# Patient Record
Sex: MALE | Race: WHITE | Employment: FULL TIME | URBAN - METROPOLITAN AREA
[De-identification: names, ages, dates, MRNs, and addresses within clinical notes are randomized per-mention and may not be internally consistent; named-entity substitution may affect disease eponyms.]

---

## 2017-01-03 ENCOUNTER — OFFICE VISIT (OUTPATIENT)
Dept: FAMILY MEDICINE CLINIC | Facility: CLINIC | Age: 42
End: 2017-01-03

## 2017-01-03 VITALS
DIASTOLIC BLOOD PRESSURE: 82 MMHG | SYSTOLIC BLOOD PRESSURE: 126 MMHG | OXYGEN SATURATION: 98 % | BODY MASS INDEX: 24.55 KG/M2 | HEART RATE: 91 BPM | HEIGHT: 68 IN | WEIGHT: 162 LBS | RESPIRATION RATE: 16 BRPM | TEMPERATURE: 99 F

## 2017-01-03 DIAGNOSIS — R74.8 ELEVATED LIVER ENZYMES: ICD-10-CM

## 2017-01-03 DIAGNOSIS — K76.0 FATTY LIVER DISEASE, NONALCOHOLIC: Primary | ICD-10-CM

## 2017-01-03 PROCEDURE — 99213 OFFICE O/P EST LOW 20 MIN: CPT | Performed by: INTERNAL MEDICINE

## 2017-01-03 NOTE — PROGRESS NOTES
[facility] Internal Medicine Office Note  Chief Complaint:   Patient presents with:  Lab Results: follow up.        HPI:   This is a 35year old male coming in for f/u for liver enzymes  Ultrasound showed fatty liver disease  Enzymes elevated  Neg 24.64 kg/m2  SpO2 98% Estimated body mass index is 24.64 kg/(m^2) as calculated from the following:    Height as of this encounter: 68\". Weight as of this encounter: 162 lb. Vital signs reviewed. Appears stated age, well groomed.   Physical Exam   Vit

## 2017-03-21 ENCOUNTER — APPOINTMENT (OUTPATIENT)
Dept: LAB | Age: 42
End: 2017-03-21
Attending: INTERNAL MEDICINE
Payer: COMMERCIAL

## 2017-03-21 DIAGNOSIS — R74.8 ELEVATED LIVER ENZYMES: ICD-10-CM

## 2017-03-21 LAB
ALBUMIN SERPL-MCNC: 4.5 G/DL (ref 3.5–4.8)
ALP LIVER SERPL-CCNC: 48 U/L (ref 45–117)
ALT SERPL-CCNC: 70 U/L (ref 17–63)
AST SERPL-CCNC: 26 U/L (ref 15–41)
BILIRUB DIRECT SERPL-MCNC: 0.1 MG/DL (ref 0.1–0.5)
BILIRUB SERPL-MCNC: 0.5 MG/DL (ref 0.1–2)
M PROTEIN MFR SERPL ELPH: 7.8 G/DL (ref 6.1–8.3)

## 2017-03-21 PROCEDURE — 36415 COLL VENOUS BLD VENIPUNCTURE: CPT

## 2017-03-21 PROCEDURE — 80076 HEPATIC FUNCTION PANEL: CPT

## 2017-03-24 ENCOUNTER — OFFICE VISIT (OUTPATIENT)
Dept: FAMILY MEDICINE CLINIC | Facility: CLINIC | Age: 42
End: 2017-03-24

## 2017-03-24 VITALS
WEIGHT: 157 LBS | BODY MASS INDEX: 23.79 KG/M2 | HEART RATE: 104 BPM | HEIGHT: 68 IN | OXYGEN SATURATION: 99 % | TEMPERATURE: 98 F | DIASTOLIC BLOOD PRESSURE: 70 MMHG | SYSTOLIC BLOOD PRESSURE: 118 MMHG | RESPIRATION RATE: 16 BRPM

## 2017-03-24 DIAGNOSIS — K76.0 FATTY LIVER DISEASE, NONALCOHOLIC: Primary | ICD-10-CM

## 2017-03-24 DIAGNOSIS — R74.8 ELEVATED LIVER ENZYMES: ICD-10-CM

## 2017-03-24 NOTE — PROGRESS NOTES
Discussed LFT results are better. Recheck in 3 months. Avoid alcohol. Limit tylenol. He plans to follow up with gastroenterology since he has had fatty liver disease for over 10 years.    He is here today with his wife and discussed recent intrauterine [de-identified]

## 2017-05-22 ENCOUNTER — TELEPHONE (OUTPATIENT)
Dept: FAMILY MEDICINE CLINIC | Facility: CLINIC | Age: 42
End: 2017-05-22

## 2017-05-22 ENCOUNTER — OFFICE VISIT (OUTPATIENT)
Dept: FAMILY MEDICINE CLINIC | Facility: CLINIC | Age: 42
End: 2017-05-22

## 2017-05-22 VITALS
HEIGHT: 68 IN | BODY MASS INDEX: 23.64 KG/M2 | DIASTOLIC BLOOD PRESSURE: 80 MMHG | SYSTOLIC BLOOD PRESSURE: 130 MMHG | RESPIRATION RATE: 16 BRPM | TEMPERATURE: 98 F | WEIGHT: 156 LBS | HEART RATE: 90 BPM

## 2017-05-22 DIAGNOSIS — M54.50 ACUTE RIGHT-SIDED LOW BACK PAIN WITHOUT SCIATICA: ICD-10-CM

## 2017-05-22 DIAGNOSIS — Z87.442 HISTORY OF KIDNEY STONES: ICD-10-CM

## 2017-05-22 DIAGNOSIS — N20.0 KIDNEY STONE: Primary | ICD-10-CM

## 2017-05-22 DIAGNOSIS — R10.9 RIGHT FLANK PAIN: ICD-10-CM

## 2017-05-22 PROBLEM — K76.0 FATTY LIVER: Status: ACTIVE | Noted: 2017-05-22

## 2017-05-22 PROCEDURE — 99213 OFFICE O/P EST LOW 20 MIN: CPT | Performed by: INTERNAL MEDICINE

## 2017-05-22 PROCEDURE — 81003 URINALYSIS AUTO W/O SCOPE: CPT | Performed by: INTERNAL MEDICINE

## 2017-05-22 RX ORDER — TAMSULOSIN HYDROCHLORIDE 0.4 MG/1
0.4 CAPSULE ORAL DAILY
Qty: 30 CAPSULE | Refills: 3 | Status: SHIPPED | OUTPATIENT
Start: 2017-05-22 | End: 2017-07-27

## 2017-05-22 RX ORDER — DICLOFENAC SODIUM 75 MG/1
75 TABLET, DELAYED RELEASE ORAL 2 TIMES DAILY
Qty: 30 TABLET | Refills: 0 | Status: SHIPPED | OUTPATIENT
Start: 2017-05-22 | End: 2017-09-21

## 2017-05-22 RX ORDER — TRAMADOL HYDROCHLORIDE 50 MG/1
50 TABLET ORAL EVERY 8 HOURS PRN
Qty: 50 TABLET | Refills: 1 | Status: SHIPPED
Start: 2017-05-22 | End: 2017-09-21

## 2017-05-22 NOTE — PATIENT INSTRUCTIONS
Thank you for choosing Daly Henning MD at [place] [address]  To Do: Yumiko Lee  1. Urine test today  2. Start diclofenac twice a day for back pain  3. Start tramadol every 8 hours for back pain  4.  Increase fluids, start flomax once a day  Kidney s side effects. In addition, there is some evidence that reducing the amount of cola that one drinks may be helpful. Studies of dietary changes varied, and a consistent benefit was not found.  Certain drugs (a thiazide diuretic, citrate, and allopurinol) that Seeds        Nuts      Nut butters      Sesame seeds      Tahini      Soy nuts  Meat      None    Starch        Amaranth      Buckwheat      Cereal (bran or high fiber)      Crisp bread (rye or wheat)      Fruit cake      Grits      Pretzels      Angelia Davis example: CT scans, X rays, Ultrasound, MRI)  •Cardiac Testing in ER building Building A second floor Cardiac Testing [phone] (For example: Holter Monitor, Cardiac Stress tests,Event Monitor, or 2D Echocardiograms)  •[facility] call [phone] follow-up appointment. We cannot refill your maintenance medications at a preventative wellness visit. To best provide you care, patients receiving maintenance medications need to be seen at least twice a year. Renuka Hidalgo

## 2017-05-22 NOTE — PROGRESS NOTES
[facility] Internal Medicine Office Note  Chief Complaint:   Patient presents with:  Low Back Pain      HPI:   This is a 35year old male coming in for  HPI  r flank r low back pain for 1 d  Pt woke up with it  Sharp stab constant 6/10 in R flank Negative for frequency and hematuria. Musculoskeletal: Positive for back pain.         EXAM:   /90 mmHg  Pulse 90  Temp(Src) 98.3 °F (36.8 °C) (Temporal)  Resp 16  Ht 68\"  Wt 156 lb  BMI 23.73 kg/m2 Estimated body mass index is 23.73 kg/(m^2) as ca 75 MG Oral Tab EC 30 tablet 0      Sig: Take 1 tablet (75 mg total) by mouth 2 (two) times daily. For back pain           Imaging & Consults:  None    There are no preventive care reminders to display for this patient.   Patient/Caregiver Education: There a

## 2017-05-30 ENCOUNTER — TELEPHONE (OUTPATIENT)
Dept: FAMILY MEDICINE CLINIC | Facility: CLINIC | Age: 42
End: 2017-05-30

## 2017-05-30 DIAGNOSIS — N20.0 KIDNEY STONE: Primary | ICD-10-CM

## 2017-05-30 NOTE — TELEPHONE ENCOUNTER
Requesting Urology referral for kidney stone  LOV: 5/22/17  RTC: not noted    No future appointments. I have pended an order to see urology. Patient was last seen by you if that is okay. Thank you.

## 2017-05-30 NOTE — TELEPHONE ENCOUNTER
I left message for Kelsey Nelson that referral was placed for Dr. Corona Alva - left number to contact his office and schedule with anyone in group

## 2017-05-30 NOTE — TELEPHONE ENCOUNTER
.Reason for the order/referral: seeking urology   PCP: [email or web address] Jose Guadalupe Day   Refer to Provider (first and last name):seeking urology   Specialty: urology   Patient Insurance: Payor: Naomi Rodriguez / Plan: Dutch Dow / Nelson Villanueva

## 2017-05-31 PROBLEM — N20.0 RENAL STONES: Status: ACTIVE | Noted: 2017-05-31

## 2017-05-31 PROBLEM — R10.9 FLANK PAIN: Status: ACTIVE | Noted: 2017-05-31

## 2017-05-31 PROBLEM — N20.1 RIGHT URETERAL STONE: Status: ACTIVE | Noted: 2017-05-31

## 2017-07-17 NOTE — TELEPHONE ENCOUNTER
Requesting fenofibrate 134 mg  LOV: 3/24/17  RTC:  Last Labs: 3/21/17  Filled: 8/4/16#90 with 3 refills    No future appointments.

## 2017-07-19 ENCOUNTER — TELEPHONE (OUTPATIENT)
Dept: FAMILY MEDICINE CLINIC | Facility: CLINIC | Age: 42
End: 2017-07-19

## 2017-07-19 RX ORDER — OMEPRAZOLE 40 MG/1
40 CAPSULE, DELAYED RELEASE ORAL DAILY
Qty: 90 CAPSULE | Refills: 3 | Status: SHIPPED | OUTPATIENT
Start: 2017-07-19 | End: 2018-12-04 | Stop reason: ALTCHOICE

## 2017-07-19 RX ORDER — FENOFIBRATE 134 MG/1
134 CAPSULE ORAL NIGHTLY
Qty: 90 CAPSULE | Refills: 3 | Status: SHIPPED | OUTPATIENT
Start: 2017-07-19 | End: 2017-11-14

## 2017-07-19 NOTE — TELEPHONE ENCOUNTER
Requesting omeprazole 40mg  LOV: 3/24/17  RTC:   Last Labs: 3/21/17  Filled: 8/4/26 # 90  with 3  refills    No future appointments.

## 2017-07-27 ENCOUNTER — TELEPHONE (OUTPATIENT)
Dept: FAMILY MEDICINE CLINIC | Facility: CLINIC | Age: 42
End: 2017-07-27

## 2017-07-27 DIAGNOSIS — R10.9 FLANK PAIN: ICD-10-CM

## 2017-07-27 DIAGNOSIS — N20.1 RIGHT URETERAL STONE: ICD-10-CM

## 2017-07-27 DIAGNOSIS — N20.0 RENAL STONES: ICD-10-CM

## 2017-07-27 RX ORDER — TAMSULOSIN HYDROCHLORIDE 0.4 MG/1
0.4 CAPSULE ORAL DAILY
Qty: 30 CAPSULE | Refills: 3 | Status: SHIPPED | COMMUNITY
Start: 2017-07-27 | End: 2017-08-26

## 2017-07-27 RX ORDER — HYDROCODONE BITARTRATE AND ACETAMINOPHEN 5; 325 MG/1; MG/1
1 TABLET ORAL EVERY 8 HOURS PRN
Qty: 45 TABLET | Refills: 0 | Status: SHIPPED | COMMUNITY
Start: 2017-07-27 | End: 2018-03-17 | Stop reason: ALTCHOICE

## 2017-07-28 ENCOUNTER — TELEPHONE (OUTPATIENT)
Dept: FAMILY MEDICINE CLINIC | Facility: CLINIC | Age: 42
End: 2017-07-28

## 2017-10-18 ENCOUNTER — TELEPHONE (OUTPATIENT)
Dept: FAMILY MEDICINE CLINIC | Facility: CLINIC | Age: 42
End: 2017-10-18

## 2017-10-18 RX ORDER — CEFDINIR 300 MG/1
300 CAPSULE ORAL 2 TIMES DAILY
Qty: 14 CAPSULE | Refills: 0 | Status: SHIPPED | OUTPATIENT
Start: 2017-10-18 | End: 2017-10-18

## 2017-10-18 RX ORDER — CEFDINIR 300 MG/1
300 CAPSULE ORAL 2 TIMES DAILY
Qty: 14 CAPSULE | Refills: 0 | Status: SHIPPED | OUTPATIENT
Start: 2017-10-18 | End: 2017-10-25

## 2017-10-30 PROCEDURE — 82365 CALCULUS SPECTROSCOPY: CPT | Performed by: UROLOGY

## 2017-11-14 ENCOUNTER — TELEPHONE (OUTPATIENT)
Dept: FAMILY MEDICINE CLINIC | Facility: CLINIC | Age: 42
End: 2017-11-14

## 2017-11-14 RX ORDER — FENOFIBRATE 145 MG/1
145 TABLET, COATED ORAL DAILY
Qty: 30 TABLET | Refills: 11 | Status: SHIPPED | OUTPATIENT
Start: 2017-11-14 | End: 2018-12-04 | Stop reason: ALTCHOICE

## 2018-03-17 PROBLEM — R10.9 FLANK PAIN: Status: RESOLVED | Noted: 2017-05-31 | Resolved: 2018-03-17

## 2018-03-17 PROBLEM — R10.9 RIGHT FLANK PAIN: Status: RESOLVED | Noted: 2017-05-22 | Resolved: 2018-03-17

## 2018-03-17 PROBLEM — N20.0 RENAL STONES: Status: RESOLVED | Noted: 2017-05-31 | Resolved: 2018-03-17

## 2018-03-17 PROCEDURE — 82607 VITAMIN B-12: CPT | Performed by: INTERNAL MEDICINE

## 2019-03-13 PROCEDURE — 83525 ASSAY OF INSULIN: CPT | Performed by: INTERNAL MEDICINE

## 2019-03-19 PROBLEM — E78.6 LOW HDL (UNDER 40): Status: ACTIVE | Noted: 2019-03-19

## 2019-03-19 PROBLEM — E88.81 INSULIN RESISTANCE: Status: ACTIVE | Noted: 2019-03-19

## 2023-12-10 NOTE — PATIENT INSTRUCTIONS
Thank you for choosing Uziel Recinos MD at [place] [address]  To Do: Gino Yan  1. Call insurance to find a hepatologist (liver specialist) or gastroenterologist  2.  Liver enzymes blood work in 2 months    • Please signup for [facility] CHART, which is el company approved your testing, please call Central Scheduling at [phone]  Please allow our office 5 business days to contact you regarding any testing results.     Refill policies:   Allow 3 business days for refills; controlled substances may take alicia fair balance

## 2024-09-23 NOTE — TELEPHONE ENCOUNTER
Ozzy Rincon called and states that pt was inquiring about RX, but they did not receive it advised pharmacy that medication was sent to [facility].  Pharmacist verbalized that they will talk to pt and see if pt wants it transferred to their pharm
Pharmacy called has questions regarding rx     Name of Medication:   Fenofibrate 145 MG Oral Tab [    Dose:     How is medication prescribed:    Specific name of pharmacy and location:  [address] [place] [address] #[id number] - [facility] [address], [address] [phone]
Normal vision: sees adequately in most situations; can see medication labels, newsprint

## (undated) NOTE — MR AVS SNAPSHOT
[facility] [address]  [address] [facility]  [phone]               Thank you for choosing us for your health care visit with Moris Hearn MD.  We are glad to serve you and happy to provide you with aware of all of the risks of treatment even beyond those discussed today.  All therapies have potential risk of harm or side effects or medication interactions.  It is your duty and for your safety to discuss with the pharmacist and our office with timothy fenofibrate micronized 134 MG Caps   Take 1 capsule (134 mg total) by mouth nightly. Commonly known as:  LOFIBRA           MULTIVITAL Tabs   Take 1 tablet by mouth daily. Omeprazole 40 MG Cpdr   Take 1 capsule (40 mg total) by mouth daily.

## (undated) NOTE — MR AVS SNAPSHOT
[facility] [address]  [address] [facility] [place] [facility]  [phone]               Thank you for choosing us for your health care visit with Sisi Sorenson MD.  We are glad to serve you and happy to provide you with Summaries. If you've been to the Emergency Department or your doctor's office, you can view your past visit information in [employer] by going to Visits < Visit Summaries. [employer] questions? Call [phone] for help.   [employer] is NOT to be used for urge

## (undated) NOTE — MR AVS SNAPSHOT
[facility] [address]  [address] [facility] Radames Self  [phone]               Thank you for choosing us for your health care visit with Jayden Hayes MD.  We are glad to serve you and happy to provide you with t another, so effective measures to prevent recurrent kidney stones would be useful. Although dietary or drug therapies are often recommended, whether these therapies can prevent repeated episodes of kidney stones is not clear.  In addition, doctors often per patient's kidney stones are made of is helpful in preventing future episodes. What are the cautions related to these recommendations? The quality of available research varies, and studies comparing 1 treatment with another are limited.     THE LOW OXALATE Leeks      Okra      Olives      Parsley      Peppers (chili and green)      Pokeweed      Potatoes (baked, boiled, fried)      R[place]aga      Spinach      Summer squash      Sweet potato      Swiss chard      Zucchini   Condiments      Black pepper (mo pharmacist and our office with questions, and to notify us and stop treatment if problems arise, but know that our intention is that the benefits outweigh those potential risks and we strive to make you healthier and to improve your quality of life.     Ref fenofibrate micronized 134 MG Caps   Take 1 capsule (134 mg total) by mouth nightly. Commonly known as:  LOFIBRA           MULTIVITAL Tabs   Take 1 tablet by mouth daily. Omeprazole 40 MG Cpdr   Take 1 capsule (40 mg total) by mouth daily. office, you can view your past visit information in [employer] by going to Visits < Visit Summaries. [employer] questions? Call [phone] for help. [employer] is NOT to be used for urgent needs. For medical emergencies, dial 911.         Educational Inform